# Patient Record
Sex: MALE | Race: WHITE | Employment: OTHER | URBAN - METROPOLITAN AREA
[De-identification: names, ages, dates, MRNs, and addresses within clinical notes are randomized per-mention and may not be internally consistent; named-entity substitution may affect disease eponyms.]

---

## 2018-01-31 ENCOUNTER — HOSPITAL ENCOUNTER (EMERGENCY)
Age: 78
Discharge: HOME | End: 2018-02-01
Attending: EMERGENCY MEDICINE | Admitting: EMERGENCY MEDICINE
Payer: COMMERCIAL

## 2018-01-31 DIAGNOSIS — R33.8 ACUTE URINARY RETENTION: Primary | ICD-10-CM

## 2018-01-31 PROCEDURE — 99282 EMERGENCY DEPT VISIT SF MDM: CPT

## 2018-01-31 PROCEDURE — 51702 INSERT TEMP BLADDER CATH: CPT

## 2018-01-31 PROCEDURE — 87086 URINE CULTURE/COLONY COUNT: CPT | Performed by: EMERGENCY MEDICINE

## 2018-01-31 RX ORDER — LEVOTHYROXINE SODIUM 50 UG/1
TABLET ORAL
COMMUNITY

## 2018-01-31 RX ORDER — VERAPAMIL HYDROCHLORIDE 180 MG/1
180 CAPSULE, EXTENDED RELEASE ORAL DAILY
COMMUNITY
End: 2021-02-02

## 2018-01-31 RX ORDER — MELATONIN
DAILY
COMMUNITY
End: 2021-02-02

## 2018-01-31 RX ORDER — TERAZOSIN 5 MG/1
5 CAPSULE ORAL
COMMUNITY
End: 2019-12-02 | Stop reason: SDUPTHER

## 2018-01-31 RX ORDER — DESVENLAFAXINE SUCCINATE 50 MG/1
50 TABLET, EXTENDED RELEASE ORAL
COMMUNITY
End: 2018-05-24

## 2018-01-31 RX ORDER — SIMVASTATIN 20 MG/1
TABLET, FILM COATED ORAL
COMMUNITY

## 2018-01-31 RX ORDER — LIDOCAINE HYDROCHLORIDE 20 MG/ML
JELLY TOPICAL ONCE
Status: COMPLETED | OUTPATIENT
Start: 2018-01-31 | End: 2018-02-01

## 2018-01-31 RX ORDER — ERGOCALCIFEROL 1.25 MG/1
50000 CAPSULE ORAL
COMMUNITY
End: 2018-02-20

## 2018-02-01 VITALS
HEART RATE: 62 BPM | DIASTOLIC BLOOD PRESSURE: 82 MMHG | OXYGEN SATURATION: 98 % | RESPIRATION RATE: 16 BRPM | SYSTOLIC BLOOD PRESSURE: 138 MMHG | TEMPERATURE: 97.7 F | WEIGHT: 157 LBS

## 2018-02-01 PROCEDURE — 74011000250 HC RX REV CODE- 250: Performed by: EMERGENCY MEDICINE

## 2018-02-01 PROCEDURE — 77030034849

## 2018-02-01 RX ADMIN — LIDOCAINE HYDROCHLORIDE: 20 JELLY TOPICAL at 00:09

## 2018-02-01 NOTE — ED NOTES
I have reviewed discharge instructions with the patient. The patient verbalized understanding. Patient discharged with garcia in place with leg bag.  Instructed to return in 3 days for garcia removal.

## 2018-02-01 NOTE — ED NOTES
Patient s/p procedure this AM with Propofol administered. Unable to void since 0630 this AM. Bladder distended and painful.

## 2018-02-01 NOTE — ED PROVIDER NOTES
HPI Comments: Anurag Sampson is a 66 y.o. Male who had egd done today with iv sedation only with c/o not being able to urinate since 630 am. C/o sense of urgency, lower abd pressure distention. No prior h/o retention, garcia placement. Pain, pressure is constant and not relieved with attempted urination. The history is provided by the patient. Past Medical History:   Diagnosis Date    BPH (benign prostatic hyperplasia)     Endocrine disease     Hyperlipidemia     Hypertension     Subdural hemorrhage ([facility] [address]        History reviewed. No pertinent surgical history. History reviewed. No pertinent family history. Social History     Social History    Marital status:      Spouse name: N/A    Number of children: N/A    Years of education: N/A     Occupational History    Not on file. Social History Main Topics    Smoking status: Never Smoker    Smokeless tobacco: Never Used    Alcohol use Yes      Comment: occasional    Drug use: Not on file    Sexual activity: Not on file     Other Topics Concern    Not on file     Social History Narrative    No narrative on file         ALLERGIES: Codeine    Review of Systems   Constitutional: Negative for fever. HENT: Negative for trouble swallowing. Respiratory: Negative for cough. Cardiovascular: Negative for chest pain. Gastrointestinal: Positive for abdominal distention and abdominal pain. Negative for blood in stool. Genitourinary: Positive for difficulty urinating. Negative for flank pain, penile swelling and scrotal swelling. Musculoskeletal: Positive for back pain. Negative for gait problem. Skin: Negative for rash. Allergic/Immunologic: Negative for immunocompromised state. Neurological: Negative for syncope. Psychiatric/Behavioral: Positive for sleep disturbance.        Vitals:    01/31/18 2256   BP: 135/89   Pulse: 64   Resp: 16   Temp: 97.7 °F (36.5 °C)   SpO2: 97%   Weight: 71.2 kg (157 lb) Physical Exam   Constitutional: He is oriented to person, place, and time. Non-toxic appearance. He does not appear ill. He appears distressed (appears very uncomfortabel). HENT:   Head: Normocephalic and atraumatic. Right Ear: External ear normal.   Left Ear: External ear normal.   Nose: Nose normal.   Mouth/Throat: Oropharynx is clear and moist. No oropharyngeal exudate. Eyes: Conjunctivae are normal.   Neck: Normal range of motion. Cardiovascular: Normal rate, regular rhythm, normal heart sounds and intact distal pulses. Pulmonary/Chest: Effort normal and breath sounds normal. No respiratory distress. Abdominal: Soft. He exhibits distension. He exhibits no mass. There is tenderness. There is no rebound and no guarding. Genitourinary: Penis normal.   Musculoskeletal: Normal range of motion. He exhibits no edema. Neurological: He is alert and oriented to person, place, and time. Skin: Skin is warm and dry. He is not diaphoretic. Psychiatric: His behavior is normal.   Nursing note and vitals reviewed. [facility]      ED Course       Procedures    Vitals:  Patient Vitals for the past 12 hrs:   Temp Pulse Resp BP SpO2   01/31/18 2256 97.7 °F (36.5 °C) 64 16 135/89 97 %         Medications ordered:   Medications   lidocaine (URO-JET) 2 % jelly ( Urethral Given 2/1/18 0009)         Lab findings:  No results found for this or any previous visit (from the past 12 hour(s)). EKG interpretation by ED Physician:      X-Ray, CT or other radiology findings or impressions:  No orders to display       Progress notes, Consult notes or additional Procedure notes:   Doubt need for ua. Will send culture. Drainage almost liter. Offered pt to remove and give another trial, but knowing he may have to return for repeat placement.  He will keep in and return in 3 days for removal  Also d/w him need for urology eval as well  I have discussed with patient and/or family/sig other the results, interpretation of any imaging if performed, suspected diagnosis and treatment plan to include instructions regarding the diagnoses listed to which understanding was expressed with all questions answered      Reevaluation of patient:   Stable for dc    Disposition:  Diagnosis:   1. Acute urinary retention        Disposition: home    Follow-up Information     Follow up With Details Comments Contact Info    Jamal Loo MD Schedule an appointment as soon as possible for a visit  [address] [address]  [phone]      [facility] EMERGENCY DEPT  3 days for removal garcia [address]  [phone]            Patient's Medications   Start Taking    No medications on file   Continue Taking    CHOLECALCIFEROL (VITAMIN D3) 1,000 UNIT TABLET    Take  by mouth daily. DESVENLAFAXINE SUCCINATE (PRISTIQ) 50 MG ER TABLET    Take 50 mg by mouth. ERGOCALCIFEROL (VITAMIN D2) 50,000 UNIT CAPSULE    Take 50,000 Units by mouth. LEVOTHYROXINE (SYNTHROID) 50 MCG TABLET    Take  by mouth Daily (before breakfast). SIMVASTATIN (ZOCOR) 20 MG TABLET    Take  by mouth nightly. TERAZOSIN (HYTRIN) 5 MG CAPSULE    Take 5 mg by mouth nightly. VERAPAMIL ER (VERELAN) 180 MG CR CAPSULE    Take 360 mg by mouth daily.    These Medications have changed    No medications on file   Stop Taking    No medications on file

## 2018-02-03 LAB
BACTERIA SPEC CULT: NORMAL
SERVICE CMNT-IMP: NORMAL

## 2018-02-05 ENCOUNTER — HOSPITAL ENCOUNTER (EMERGENCY)
Age: 78
Discharge: HOME | End: 2018-02-05
Attending: EMERGENCY MEDICINE
Payer: COMMERCIAL

## 2018-02-05 VITALS
HEART RATE: 58 BPM | OXYGEN SATURATION: 98 % | WEIGHT: 155 LBS | TEMPERATURE: 97.9 F | BODY MASS INDEX: 24.91 KG/M2 | HEIGHT: 66 IN | SYSTOLIC BLOOD PRESSURE: 119 MMHG | RESPIRATION RATE: 16 BRPM | DIASTOLIC BLOOD PRESSURE: 80 MMHG

## 2018-02-05 DIAGNOSIS — Z46.6 ENCOUNTER FOR FOLEY CATHETER REMOVAL: Primary | ICD-10-CM

## 2018-02-05 PROCEDURE — 99281 EMR DPT VST MAYX REQ PHY/QHP: CPT

## 2018-02-05 NOTE — DISCHARGE INSTRUCTIONS
[employer] Activation    Thank you for requesting access to [employer]. Please follow the instructions below to securely access and download your online medical record. [employer] allows you to send messages to your doctor, view your test results, renew your prescriptions, schedule appointments, and more. How Do I Sign Up? 1. In your internet browser, go to [email or web address]  2. Click on the First Time User? Click Here link in the Sign In box. You will be redirect to the New Member Sign Up page. 3. Enter your [employer] Access Code exactly as it appears below. You will not need to use this code after youve completed the sign-up process. If you do not sign up before the expiration date, you must request a new code. [employer] Access Code: [email or web address]  Expires: 2018  1:20 AM (This is the date your [employer] access code will )    4. Enter the last four digits of your Social Security Number (xxxx) and Date of Birth (mm/dd/yyyy) as indicated and click Submit. You will be taken to the next sign-up page. 5. Create a [employer] ID. This will be your [employer] login ID and cannot be changed, so think of one that is secure and easy to remember. 6. Create a [employer] password. You can change your password at any time. 7. Enter your Password Reset Question and Answer. This can be used at a later time if you forget your password. 8. Enter your e-mail address. You will receive e-mail notification when new information is available in [address] 9. Click Sign Up. You can now view and download portions of your medical record. 10. Click the Download Summary menu link to download a portable copy of your medical information. Additional Information    If you have questions, please visit the Frequently Asked Questions section of the [employer] website at [email or web address]. [employer]. [email or web address] Remember, [employer] is NOT to be used for urgent needs. For medical emergencies, dial 911. Keep well hydrated.  Drink at least 2 to 3 liters of fresh water daily. Return at once for return of urinary symptoms.

## 2018-02-05 NOTE — ED NOTES
12:50 PM  02/05/18     Discharge instructions given to patient (name) with verbalization of understanding. Patient accompanied by self. Patient discharged with the following prescriptions none. Patient discharged to home (destination).       Baltazar Fair RN

## 2018-02-05 NOTE — ED PROVIDER NOTES
EMERGENCY DEPARTMENT HISTORY AND PHYSICAL EXAM    Date: 2/5/2018  Patient Name: Terry Kendrick    History of Presenting Illness     Chief Complaint   Patient presents with    Urinary Catheter Problem         History Provided By: Patient    Chief Complaint:  Garcia Catheter removal  Duration: 6  Days  Timing:  N/A  Location: Garcia catheter in urethra  Quality: Pressure  Severity: Mild  Modifying Factors:  Previous urinary retention    Associated Symptoms: denies any other associated signs or symptoms      Additional History (Context): Terry Kendrick is a 66 y.o. male with  who presents with a request for garcia catheter removal.  He had a garcia placed on 01-31-18 for urinary retention post endoscopy. States he was told to come back today. PCP: Joey Lemon MD    Current Outpatient Prescriptions   Medication Sig Dispense Refill    terazosin (HYTRIN) 5 mg capsule Take 5 mg by mouth nightly.  verapamil ER (VERELAN) 180 mg CR capsule Take 360 mg by mouth daily.  levothyroxine (SYNTHROID) 50 mcg tablet Take  by mouth Daily (before breakfast).  desvenlafaxine succinate (PRISTIQ) 50 mg ER tablet Take 50 mg by mouth.  simvastatin (ZOCOR) 20 mg tablet Take  by mouth nightly.  cholecalciferol (VITAMIN D3) 1,000 unit tablet Take  by mouth daily.  ergocalciferol (VITAMIN D2) 50,000 unit capsule Take 50,000 Units by mouth. Past History     Past Medical History:  Past Medical History:   Diagnosis Date    BPH (benign prostatic hyperplasia)     Endocrine disease     Hyperlipidemia     Hypertension     Subdural hemorrhage ([facility] [address]        Past Surgical History:  No past surgical history on file. Family History:  No family history on file. Social History:  Social History   Substance Use Topics    Smoking status: Never Smoker    Smokeless tobacco: Never Used    Alcohol use Yes      Comment: occasional       Allergies:   Allergies   Allergen Reactions    Codeine Nausea and Vomiting         Review of Systems   Review of Systems   Constitutional: Negative. HENT: Negative. Eyes: Negative. Respiratory: Negative. Cardiovascular: Negative. Gastrointestinal: Negative. Endocrine: Negative. Genitourinary: Negative. Pt presents for urinary catheter removal.     Musculoskeletal: Negative. Skin: Negative. Allergic/Immunologic: Negative. Neurological: Negative. Hematological: Negative. Psychiatric/Behavioral: Negative. All Other Systems Negative  Physical Exam     Vitals:    02/05/18 1116   BP: 119/80   Pulse: (!) 58   Resp: 16   Temp: 97.9 °F (36.6 °C)   SpO2: 98%   Weight: 70.3 kg (155 lb)   Height: 5' 6\" (1.676 m)     Physical Exam   Constitutional: He is oriented to person, place, and time. He appears well-developed and well-nourished. No distress. HENT:   Head: Normocephalic and atraumatic. Eyes: Pupils are equal, round, and reactive to light. Neck: Normal range of motion. Neck supple. Cardiovascular: Normal rate and regular rhythm. Pulmonary/Chest: Effort normal and breath sounds normal. He has no wheezes. He has no rales. Abdominal: He exhibits no distension. Genitourinary:   Genitourinary Comments:  indwelling garcia catheter   Musculoskeletal: Normal range of motion. Neurological: He is alert and oriented to person, place, and time. Skin: Skin is warm and dry. Psychiatric: He has a normal mood and affect. Nursing note and vitals reviewed. Diagnostic Study Results     Labs -   No results found for this or any previous visit (from the past 12 hour(s)). Radiologic Studies -   No orders to display     CT Results  (Last 48 hours)    None        CXR Results  (Last 48 hours)    None            Medical Decision Making   I am the first provider for this patient. I reviewed the vital signs, available nursing notes, past medical history, past surgical history, family history and social history.     Vital Signs-Reviewed the patient's vital signs. Pulse Oximetry Analysis - 100 % on  RA     Rate:    Rhythm:   Interpretation:   Comparison:    Records Reviewed: Nursing Notes    Procedures:  Procedures    Provider Notes (Medical Decision Making):     MED RECONCILIATION:  No current facility-administered medications for this encounter. Current Outpatient Prescriptions   Medication Sig    terazosin (HYTRIN) 5 mg capsule Take 5 mg by mouth nightly.  verapamil ER (VERELAN) 180 mg CR capsule Take 360 mg by mouth daily.  levothyroxine (SYNTHROID) 50 mcg tablet Take  by mouth Daily (before breakfast).  desvenlafaxine succinate (PRISTIQ) 50 mg ER tablet Take 50 mg by mouth.  simvastatin (ZOCOR) 20 mg tablet Take  by mouth nightly.  cholecalciferol (VITAMIN D3) 1,000 unit tablet Take  by mouth daily.  ergocalciferol (VITAMIN D2) 50,000 unit capsule Take 50,000 Units by mouth. Disposition:  Discharged to Home. DISCHARGE NOTE:     Pt has been reexamined. Patient has no new complaints, changes, or physical findings. Care plan outlined and precautions discussed. All of pt's questions and concerns were addressed. Patient was instructed and agrees to follow up with his primary care physician , as well as to return to the ED upon further deterioration. Patient is ready to go home. Follow-up Information     None          Current Discharge Medication List              Diagnosis     Clinical Impression: No diagnosis found. Diagnosis:   1. Encounter for Mcmullen catheter removal          Disposition:   Discharged to Home    Follow-up Information     Follow up With Details Comments Gena Peña MD Call as needed for follow up. [address]            Patient's Medications   Start Taking    No medications on file   Continue Taking    CHOLECALCIFEROL (VITAMIN D3) 1,000 UNIT TABLET    Take  by mouth daily.     DESVENLAFAXINE SUCCINATE (PRISTIQ) 50 MG ER TABLET    Take 50 mg by mouth. ERGOCALCIFEROL (VITAMIN D2) 50,000 UNIT CAPSULE    Take 50,000 Units by mouth. LEVOTHYROXINE (SYNTHROID) 50 MCG TABLET    Take  by mouth Daily (before breakfast). SIMVASTATIN (ZOCOR) 20 MG TABLET    Take  by mouth nightly. TERAZOSIN (HYTRIN) 5 MG CAPSULE    Take 5 mg by mouth nightly. VERAPAMIL ER (VERELAN) 180 MG CR CAPSULE    Take 360 mg by mouth daily.    These Medications have changed    No medications on file   Stop Taking    No medications on file

## 2020-02-19 ENCOUNTER — HOSPITAL ENCOUNTER (OUTPATIENT)
Dept: PHYSICAL THERAPY | Age: 80
Discharge: HOME | End: 2020-02-19
Payer: COMMERCIAL

## 2020-02-19 PROCEDURE — 97162 PT EVAL MOD COMPLEX 30 MIN: CPT | Performed by: GENERAL ACUTE CARE HOSPITAL

## 2020-02-19 PROCEDURE — 97110 THERAPEUTIC EXERCISES: CPT | Performed by: GENERAL ACUTE CARE HOSPITAL

## 2020-02-19 NOTE — PROGRESS NOTES
PT DAILY TREATMENT NOTE     Patient Name: Juliane Castaneda  Date:2020  : 1940  [x]  Patient  Verified  Payor: Tracy Patel / Plan: [address] PT / Product Type: Commerical /    In time: 3:10  Out time: 3:45  Total Treatment Time (min): 35  Total Timed Codes (min): 10  1:1 Treatment Time (min): 10   Visit #: 1 of     Treatment Area: Other acute postprocedural pain [G89.18]  Left knee pain [M25.562]    SUBJECTIVE  Pain Level (0-10 scale): 0  Any medication changes, allergies to medications, adverse drug reactions, diagnosis change, or new procedure performed?: [x] No    [] Yes (see summary sheet for update)  Subjective functional status/changes:   [] No changes reported   Pt is an [de-identified] y/o male who presents s/p L TKA revision (original TKA in ). States that 4 days after surgery he was re-admitted to the hospital for 1 week due to PNA and fungal infection of esophagus. Received HHPT until last week. No longer using an assistive device for mobility. Feels that strength and endurance are his greatest barriers at this time. Only reports minor pain (1/10) with gait and daily activities. Denies use of ice for pain/edema management.  Pt will benefit from skilled PT in order to address current impairments and improve functional mobility and QOL.      Walking tolerance: 10 minutes at a time  Standing tolerance: 2 hours max      (L) knee AROM:  7-95  Patellar mobility: stiff, guarded  +ttp medial/lateral joint line, lateral gastroc     Strength:   (R) Hip flexion 4/5                              ABD 3+/5              Ext 3+/5  (R) Knee flexion 4/5            Ext 4+/5     Swelling: Circumference @ jt line  L 44.5 cm, R 38 cm, supra patella L 45.5                                 1+ Pitting edema in distal calf      Gait: reduced TKE in stance phase and knee flexion in swing, mild antalgia  SLS: 2 sec L   Stairs: reciprocal pattern with single rail     OBJECTIVE  Modality rationale: PD   Min Type Additional Details    [] Estim: []Att   []Unatt  []TENS instruct                 []IFC  []Premod []NMES                       []Other:  []w/US   []w/ice   []w/heat  Position:  Location:    []  Traction: [] Cervical       []Lumbar                       [] Prone          []Supine                       []Intermittent   []Continuous Lbs:  [] before manual  [] after manual    []  Ultrasound: []Continuous   [] Pulsed                           []1MHz   []3MHz Location:  W/cm2:    []  Iontophoresis with dexamethasone         Location: [] Take home patch   [] In clinic    []  Ice     []  heat  []  Ice massage Position:  Location:    []  Vasopneumatic Device Pressure: [] lo [] med [] hi   Temp: [] lo [] med [] hi   [] Skin assessment post-treatment:  []intact []redness- no adverse reaction       []redness - adverse reaction:       10 min Therapeutic Exercise:  [] See flow sheet :   Rationale: increase ROM and increase strength to improve the patients ability to perform functional ADL's, walk, navigate stairs             min Patient Education: [x] Review HEP    [] Progressed/Changed HEP based on:   [] positioning   [] body mechanics   [] transfers   [] heat/ice application        Other Objective/Functional Measures:   Justification for Eval Code Complexity:  Patient History : Depression, arthritis, thyroid issues, HTN, recent 10 lb weight loss (due to hospital admission)  Examination see exam   Clinical Presentation: evolving  Clinical Decision Making : FOTO : 51 /100  Pain Level (0-10 scale) post treatment: 0/10    ASSESSMENT/Changes in Function: See POC    Patient will continue to benefit from skilled PT services to modify and progress therapeutic interventions, address functional mobility deficits, address ROM deficits, address strength deficits, analyze and address soft tissue restrictions, analyze and cue movement patterns, analyze and modify body mechanics/ergonomics, assess and modify postural abnormalities, address imbalance/dizziness and instruct in home and community integration to attain remaining goals. [x]  See Plan of Care  []  See progress note/recertification  []  See Discharge Summary         Progress towards goals / Updated goals: · Short Term Goals: To be accomplished in  1  weeks:  1) Pt will be independent and compliant with HEP  · Long Term Goals:  To be accomplished in  4-6  weeks:  1) Pt will score > 60/100 on FOTO to show significant improvement in functional mobility and QOL  2) Pt will demo (L) knee flexion >110 for ease of dressing ADL's  3) Pt will demo full TKE in order to normalize gait kinematics for community ambulation  4) Pt will demo >4/5 hip strength for ease of stair navigation     PLAN  [x]  Upgrade activities as tolerated     []  Continue plan of care  []  Update interventions per flow sheet       []  Discharge due to:_  []  Other:_      Rea Joy, PT 2/19/2020  3:51 PM

## 2020-02-19 NOTE — PROGRESS NOTES
[address] [facility] [address] [facility] AT [address], [facility]  Phone: [phone] Fax: [phone] / STATEMENT OF MEDICAL NECESSITY FOR PHYSICAL THERAPY SERVICES  Patient Name: Zhanna Alvarez : 1940   Medical   Diagnosis: Other acute postprocedural pain [G89.18]  Left knee pain [M25.562] Treatment Diagnosis: Other acute postprocedural pain [G89.18]  Left knee pain [M25.562]   Onset Date: DOS 20     Referral Source: Myranda Peralta MD [facility]): 2020   Prior Hospitalization: See medical history Provider #: [id number]   Prior Level of Function: Functionally indep; retired. Lives with wife and son. [address] with bedroom on second level. Comorbidities: Depression, arthritis, thyroid issues, HTN, recent 10 lb weight loss (due to hospital admission)   Medications: Verified on Patient Summary List   The Plan of Care and following information is based on the information from the initial evaluation.   ========================================================================  Assessment / key information:  Pt is an [de-identified] y/o male who presents s/p L TKA revision (original TKA in ). States that 4 days after surgery he was re-admitted to the hospital for 1 week due to PNA and fungal infection of esophagus. Received HHPT until last week. No longer using an assistive device for mobility. Feels that strength and endurance are his greatest barriers at this time. Only reports minor pain (1/10) with gait and daily activities. Denies use of ice for pain/edema management. Pt will benefit from skilled PT in order to address current impairments and improve functional mobility and QOL.      Walking tolerance: 10 minutes at a time  Standing tolerance: 2 hours max     (L) knee AROM:  7-95  Patellar mobility: stiff, guarded  +ttp medial/lateral joint line, lateral gastroc    Strength:   (R) Hip flexion 4/5  ABD 3+/5  Ext 3+/5  (R) Knee flexion 4/5 Ext 4+/5    Swelling: Circumference @ jt line  L 44.5 cm, R 38 cm, supra patella L 45.5     1+ Pitting edema in distal calf     Gait: reduced TKE in stance phase and knee flexion in swing, mild antalgia  SLS: 2 sec L   Stairs: reciprocal pattern with single rail   ========================================================================  Eval Complexity: History: HIGH Complexity :3+ comorbidities / personal factors will impact the outcome/ POC Exam:HIGH Complexity : 4+ Standardized tests and measures addressing body structure, function, activity limitation and / or participation in recreation  Presentation: MEDIUM Complexity : Evolving with changing characteristics  Clinical Decision Making:MEDIUM Complexity : FOTO score of 26-74Overall Complexity:MEDIUM  Problem List: pain affecting function, decrease ROM, decrease strength, edema affecting function, impaired gait/ balance, decrease ADL/ functional abilitiies, decrease activity tolerance, decrease flexibility/ joint mobility and decrease transfer abilities   Treatment Plan may include any combination of the following: Therapeutic exercise, Therapeutic activities, Neuromuscular re-education, Physical agent/modality, Gait/balance training, Manual therapy, Patient education, Self Care training, Functional mobility training, Home safety training and Stair training  Patient / Family readiness to learn indicated by: asking questions, trying to perform skills and interest  Persons(s) to be included in education: patient (P)  Barriers to Learning/Limitations: None  Measures taken:    Patient Goal (s): \"painless activity\"   Patient self reported health status: good  Rehabilitation Potential: good   Short Term Goals: To be accomplished in  1  weeks:  1) Pt will be independent and compliant with HEP   Long Term Goals:  To be accomplished in  4-6  weeks:  1) Pt will score > 60/100 on FOTO to show significant improvement in functional mobility and QOL  2) Pt will demo (L) knee flexion >110 for ease of dressing ADL's  3) Pt will demo full TKE in order to normalize gait kinematics for community ambulation  4) Pt will demo >4/5 hip strength for ease of stair navigation     Frequency / Duration:   Patient to be seen  2-3  times per week for 4-6  weeks:  Patient / Caregiver education and instruction: activity modification and exercises  Therapist Signature: Denton Mcdowell PT Date: 7/91/8813   Certification Period: 2/19/20 to 5/19/20  Time: 10:03 AM   ========================================================================  I certify that the above Physical Therapy Services are being furnished while the patient is under my care. I agree with the treatment plan and certify that this therapy is necessary. Physician Signature:        Date:       Time:   Please sign and return to [facility] or you may fax the signed copy to [phone]. Thank you.

## 2020-02-26 ENCOUNTER — HOSPITAL ENCOUNTER (OUTPATIENT)
Dept: PHYSICAL THERAPY | Age: 80
Discharge: HOME | End: 2020-02-26
Payer: COMMERCIAL

## 2020-02-26 PROCEDURE — 97016 VASOPNEUMATIC DEVICE THERAPY: CPT | Performed by: GENERAL ACUTE CARE HOSPITAL

## 2020-02-26 PROCEDURE — 97110 THERAPEUTIC EXERCISES: CPT | Performed by: GENERAL ACUTE CARE HOSPITAL

## 2020-02-26 PROCEDURE — 97140 MANUAL THERAPY 1/> REGIONS: CPT | Performed by: GENERAL ACUTE CARE HOSPITAL

## 2020-02-26 NOTE — PROGRESS NOTES
PT DAILY TREATMENT NOTE     Patient Name: Quynh Watts  Date:2020  : 1940  [x]  Patient  Verified  Payor: Tiffanie Medico / Plan: [address] [place] [address] Rd PT / Product Type: Commerical /    In time: 2:00  Out time: 3:01   Total Treatment Time (min): 61  Total Timed Codes (min): 51  1:1 Treatment Time (min): 51   Visit #: 2 of     Treatment Area: Other acute postprocedural pain [G89.18]  Left knee pain [M25.562]    SUBJECTIVE  Pain Level (0-10 scale): 0/10  Any medication changes, allergies to medications, adverse drug reactions, diagnosis change, or new procedure performed?: [x] No    [] Yes (see summary sheet for update)  Subjective functional status/changes:   [] No changes reported  Saw Orthopedic MD yesterday whom reports that everything looks great and to continue with PT. Hedii haley tramadol prior to therapy today.      OBJECTIVE  Modality rationale: decrease edema, decrease inflammation and decrease pain to improve the patients ability to perform functional LE tasks    Min Type Additional Details    [] Estim: []Att   []Unatt  []TENS instruct                 []IFC  []Premod []NMES                       []Other:  []w/US   []w/ice   []w/heat  Position:  Location:    []  Traction: [] Cervical       []Lumbar                       [] Prone          []Supine                       []Intermittent   []Continuous Lbs:  [] before manual  [] after manual    []  Ultrasound: []Continuous   [] Pulsed                           []1MHz   []3MHz Location:  W/cm2:    []  Iontophoresis with dexamethasone         Location: [] Take home patch   [] In clinic    []  Ice     []  heat  []  Ice massage Position:  Location:   10 [x]  Vasopneumatic Device Pressure: [] lo [x] med [] hi   Temp: [x] lo [] med [] hi   [x] Skin assessment post-treatment:  [x]intact []redness- no adverse reaction       []redness - adverse reaction:       41 min Therapeutic Exercise:  [] See flow sheet : initiated per FS   Rationale: increase ROM and increase strength to improve the patients ability to ambulate, navigate stairs and perform       10 min Manual Therapy:  PROM L knee flexion/extension. Ant/post tib mobs for increased mobility. Stick-rolling to calf. Rationale: decrease pain, increase ROM and increase tissue extensibility to promote increased ROM for ease of dressing ADL's and gait mechanics             min Patient Education: [x] Review HEP    [] Progressed/Changed HEP based on:   [] positioning   [] body mechanics   [] transfers   [] heat/ice application        Other Objective/Functional Measures:   First FU treatment   Discomfort with prone SLR-- re assess NV and d/c if painful. (L) Knee AROM: 7-100    Pain Level (0-10 scale) post treatment: 0/10    ASSESSMENT/Changes in Function: Good tolerance to initial treatment session with patient requiring 100% VC's for all newly introduced exercises. Patient will continue to benefit from skilled PT services to modify and progress therapeutic interventions, address functional mobility deficits, address ROM deficits, address strength deficits, analyze and address soft tissue restrictions, analyze and cue movement patterns, analyze and modify body mechanics/ergonomics, assess and modify postural abnormalities, address imbalance/dizziness and instruct in home and community integration to attain remaining goals. [x]  See Plan of Care  []  See progress note/recertification  []  See Discharge Summary         Progress towards goals / Updated goals: · Short Term Goals: To be accomplished in  1  weeks:  1) Pt will be independent and compliant with HEP Met; pt reports compliance- will con't to monitor as HEP is progressed (2/26/20)  · Long Term Goals:  To be accomplished in  4-6  weeks:  1) Pt will score > 60/100 on FOTO to show significant improvement in functional mobility and QOL  2) Pt will demo (L) knee flexion >110 for ease of dressing ADL's  3) Pt will demo full TKE in order to normalize gait kinematics for community ambulation  4) Pt will demo >4/5 hip strength for ease of stair navigation     PLAN  [x]  Upgrade activities as tolerated     []  Continue plan of care  []  Update interventions per flow sheet       []  Discharge due to:_  []  Other:_      Staci Maldonado, PT 2/26/2020  1:55 PM

## 2020-02-28 ENCOUNTER — HOSPITAL ENCOUNTER (OUTPATIENT)
Dept: PHYSICAL THERAPY | Age: 80
Discharge: HOME | End: 2020-02-28
Payer: COMMERCIAL

## 2020-02-28 PROCEDURE — 97016 VASOPNEUMATIC DEVICE THERAPY: CPT

## 2020-02-28 PROCEDURE — 97140 MANUAL THERAPY 1/> REGIONS: CPT

## 2020-02-28 PROCEDURE — 97110 THERAPEUTIC EXERCISES: CPT

## 2020-02-28 NOTE — PROGRESS NOTES
PT DAILY TREATMENT NOTE     Patient Name: Elenita Melendez  Date:2020  : 1940  [x]  Patient  Verified  Payor: Joseph Karoilna / Plan: [address] PT / Product Type: Commerical /    In time: 2:00 pm          Out time: 3:08 pm  Total Treatment Time (min): 68  Total Timed Codes (min): 58  1:1 Treatment Time (min): 40  Visit #: 3 of     Treatment Area: Other acute postprocedural pain [G89.18]  Left knee pain [M25.562]    SUBJECTIVE  Pain Level (0-10 scale): 1  Any medication changes, allergies to medications, adverse drug reactions, diagnosis change, or new procedure performed?: [x] No    [] Yes (see summary sheet for update)  Subjective functional status/changes:   [] No changes reported  \"I am doing okay with the stairs.  \"    OBJECTIVE  Modality rationale: decrease edema, decrease inflammation and decrease pain to improve the patients ability to perform functional LE tasks    Min Type Additional Details    [] Estim: []Att   []Unatt  []TENS instruct                 []IFC  []Premod []NMES                       []Other:  []w/US   []w/ice   []w/heat  Position:  Location:    []  Traction: [] Cervical       []Lumbar                       [] Prone          []Supine                       []Intermittent   []Continuous Lbs:  [] before manual  [] after manual    []  Ultrasound: []Continuous   [] Pulsed                           []1MHz   []3MHz Location:  W/cm2:    []  Iontophoresis with dexamethasone         Location: [] Take home patch   [] In clinic    []  Ice     []  heat  []  Ice massage Position:  Location:   10 [x]  Vasopneumatic Device Pressure: [] lo [x] med [] hi   Temp: [x] lo [] med [] hi   [x] Skin assessment post-treatment:  [x]intact []redness- no adverse reaction       []redness - adverse reaction:     46 min Therapeutic Exercise:  [x] See flow sheet: added step ups (forward and lateral)   Rationale: increase ROM and increase strength to improve the patients ability to ambulate, navigate stairs and perform     12 min Manual Therapy:  (L) knee patellar mobs f/b retro massage; PROM L knee flexion/extension; ant/post tib mobs for increased mobility; DTM to proximal to calf   Rationale: decrease pain, increase ROM and increase tissue extensibility to promote increased ROM for ease of dressing ADL's and gait mechanics        X min Patient Education: [x] Review HEP       Other Objective/Functional Measures:   (-) quad lag with standing hip flexion  (L) knee AA/PROM flexion: 105 deg / 110 deg  Lacking full TKE with SAQs. Pain Level (0-10 scale) post treatment: 1    ASSESSMENT/Changes in Function:   Patient demos slowly improving knee mobility into flexion; responds well and requests aggressive tibial mobilizations to reduce perceived stiffness. Good weight shift and pushoff using the (L) knee for step ups, but pt req min cueing to eccentrically flex the knee for stair descent. Patient will continue to benefit from skilled PT services to modify and progress therapeutic interventions, address functional mobility deficits, address ROM deficits, address strength deficits, analyze and address soft tissue restrictions, analyze and cue movement patterns, analyze and modify body mechanics/ergonomics, assess and modify postural abnormalities, address imbalance/dizziness and instruct in home and community integration to attain remaining goals. [x]  See Plan of Care  []  See progress note/recertification  []  See Discharge Summary         Progress towards goals / Updated goals: · Short Term Goals: To be accomplished in  1  weeks:  1) Pt will be independent and compliant with HEP Met; pt reports compliance- will con't to monitor as HEP is progressed (2/26/20)  · Long Term Goals: To be accomplished in  4-6  weeks:  1) Pt will score > 60/100 on FOTO to show significant improvement in functional mobility and QOL.   2) Pt will demo (L) knee flexion >110 for ease of dressing ADL's. -Goal progressing; AAROM flexion 105 deg with belt, passive 110 deg (2/28/20)  3) Pt will demo full TKE in order to normalize gait kinematics for community ambulation. -Goal progressing; (-) quad lag with standing SLR (2/28/20)  4) Pt will demo >4/5 hip strength for ease of stair navigation.      PLAN  [x]  Upgrade activities as tolerated     [x]  Continue plan of care  []  Update interventions per flow sheet       []  Discharge due to:_  []  Other:_      Gilberto Culp, PTA 2/28/2020

## 2020-03-02 ENCOUNTER — HOSPITAL ENCOUNTER (OUTPATIENT)
Dept: PHYSICAL THERAPY | Age: 80
Discharge: HOME | End: 2020-03-02
Payer: COMMERCIAL

## 2020-03-02 PROCEDURE — 97140 MANUAL THERAPY 1/> REGIONS: CPT

## 2020-03-02 PROCEDURE — 97110 THERAPEUTIC EXERCISES: CPT

## 2020-03-02 NOTE — PROGRESS NOTES
PT DAILY TREATMENT NOTE     Patient Name: Mary Jo Jones  Date:3/2/2020  : 1940  [x]  Patient  Verified  Payor: Anu Bernnan / Plan: [address] [place] [address] Rd PT / Product Type: Commerical /    In time: 3:00 pm            Out time: 3:56 pm  Total Treatment Time (min): 56  Total Timed Codes (min): 46  1:1 Treatment Time (min): 3:10pm-3:40pm (30)  Visit #: 4 of     Treatment Area: Other acute postprocedural pain [G89.18]  Left knee pain [M25.562]    SUBJECTIVE  Pain Level (0-10 scale): 1 - \"stiff\"  Any medication changes, allergies to medications, adverse drug reactions, diagnosis change, or new procedure performed?: [x] No    [] Yes (see summary sheet for update)  Subjective functional status/changes:   [] No changes reported  \"I had to stand at the [facility] for 1 hour and I am pretty okay, but I took a pain pill. \"    OBJECTIVE  Modality rationale: decrease edema, decrease inflammation and decrease pain to improve the patients ability to perform functional LE tasks    Min Type Additional Details    [] Estim: []Att   []Unatt  []TENS instruct                 []IFC  []Premod []NMES                       []Other:  []w/US   []w/ice   []w/heat  Position:  Location:    []  Traction: [] Cervical       []Lumbar                       [] Prone          []Supine                       []Intermittent   []Continuous Lbs:  [] before manual  [] after manual    []  Ultrasound: []Continuous   [] Pulsed                           []1MHz   []3MHz Location:  W/cm2:    []  Iontophoresis with dexamethasone         Location: [] Take home patch   [] In clinic   10 [x]  Ice     []  heat  []  Ice massage Position: supine  Location: (L) knee    []  Vasopneumatic Device Pressure: [] lo [] med [] hi   Temp: [] lo [] med [] hi   [x] Skin assessment post-treatment:  [x]intact []redness- no adverse reaction       []redness - adverse reaction:     35 min Therapeutic Exercise:  [x] See flow sheet:    Rationale: increase ROM and increase strength to improve the patients ability to ambulate, navigate stairs and perform     11 min Manual Therapy:  (L) knee patellar mobs f/b retro massage; PROM L knee flexion/extension; ant/post tib mobs for increased mobility; DTM to proximal to calf   Rationale: decrease pain, increase ROM and increase tissue extensibility to promote increased ROM for ease of dressing ADL's and gait mechanics        X min Patient Education: [x] Review HEP       Other Objective/Functional Measures:   (+) quad lag with SLR    Pain Level (0-10 scale) post treatment: 0    ASSESSMENT/Changes in Function:   Patient challenged with step ups today req min cueing to reduce speed to improve quad recruitment. Patient will continue to benefit from skilled PT services to modify and progress therapeutic interventions, address functional mobility deficits, address ROM deficits, address strength deficits, analyze and address soft tissue restrictions, analyze and cue movement patterns, analyze and modify body mechanics/ergonomics, assess and modify postural abnormalities, address imbalance/dizziness and instruct in home and community integration to attain remaining goals. [x]  See Plan of Care  []  See progress note/recertification  []  See Discharge Summary         Progress towards goals / Updated goals: · Short Term Goals: To be accomplished in  1  weeks:  1) Pt will be independent and compliant with HEP Met; pt reports compliance- will con't to monitor as HEP is progressed (2/26/20)  · Long Term Goals: To be accomplished in  4-6  weeks:  1) Pt will score > 60/100 on FOTO to show significant improvement in functional mobility and QOL.   2) Pt will demo (L) knee flexion >110 for ease of dressing ADL's. -Goal progressing; AAROM flexion 105 deg with belt, passive 110 deg (2/28/20)  3) Pt will demo full TKE in order to normalize gait kinematics for community ambulation. -Goal progressing; (-) quad lag with standing SLR (2/28/20)  4) Pt will demo >4/5 hip strength for ease of stair navigation.      PLAN  [x]  Upgrade activities as tolerated     [x]  Continue plan of care  []  Update interventions per flow sheet       []  Discharge due to:_  []  Other:_      Mariella Graves, MARIYA 3/2/2020

## 2020-03-04 ENCOUNTER — APPOINTMENT (OUTPATIENT)
Dept: PHYSICAL THERAPY | Age: 80
End: 2020-03-04
Payer: COMMERCIAL

## 2020-03-06 ENCOUNTER — HOSPITAL ENCOUNTER (OUTPATIENT)
Dept: PHYSICAL THERAPY | Age: 80
End: 2020-03-06
Payer: COMMERCIAL

## 2020-03-09 ENCOUNTER — APPOINTMENT (OUTPATIENT)
Dept: PHYSICAL THERAPY | Age: 80
End: 2020-03-09
Payer: COMMERCIAL

## 2020-03-10 ENCOUNTER — APPOINTMENT (OUTPATIENT)
Dept: PHYSICAL THERAPY | Age: 80
End: 2020-03-10

## 2020-03-11 ENCOUNTER — APPOINTMENT (OUTPATIENT)
Dept: PHYSICAL THERAPY | Age: 80
End: 2020-03-11
Payer: COMMERCIAL

## 2020-03-13 ENCOUNTER — APPOINTMENT (OUTPATIENT)
Dept: PHYSICAL THERAPY | Age: 80
End: 2020-03-13
Payer: COMMERCIAL

## 2020-03-16 ENCOUNTER — APPOINTMENT (OUTPATIENT)
Dept: PHYSICAL THERAPY | Age: 80
End: 2020-03-16
Payer: COMMERCIAL

## 2020-03-18 ENCOUNTER — APPOINTMENT (OUTPATIENT)
Dept: PHYSICAL THERAPY | Age: 80
End: 2020-03-18
Payer: COMMERCIAL

## 2020-03-20 ENCOUNTER — APPOINTMENT (OUTPATIENT)
Dept: PHYSICAL THERAPY | Age: 80
End: 2020-03-20
Payer: COMMERCIAL

## 2020-04-01 NOTE — PROGRESS NOTES
[address] [facility] [address] [facility] AT [address]  Phone: [phone] Fax: [phone]  PROGRESS NOTE  Patient Name: Willy Duffy : 1940   Treatment/Medical Diagnosis: Other acute postprocedural pain [G89.18]  Left knee pain [M25.562]   Referral Source: Olga Macias MD     Date of Initial Visit: 2020 Attended Visits: 4 Missed Visits: 2     SUMMARY OF TREATMENT  Thank you for sending your patient to be cared for in our facility. While we are STILL OPEN for essential patients, this pt has been placed on a temporarily hold due to the nationwide concerns over COVID-19. The physical therapist has issued HEP and therapy staff will continue to contact pt every 1-2 weeks via phone to monitor progress as the patient feels necessary. If the patient desires to return, we plan to resume physical therapy once concerns improve and will be performing reassessments upon return. Thank you and please feel free to reach out to use with any questions or concerns. If you have any questions/comments please contact us directly at [phone]   Thank you for allowing us to assist in the care of your patient. Therapist Signature: Breana Betts PT Date: 2020   Reporting Period  NA Time: 6:52 AM   NOTE TO PHYSICIAN:  PLEASE COMPLETE THE ORDERS BELOW AND FAX TO   [facility]: [phone]. If you are unable to process this request in 24 hours please contact our office: [phone].  ___ I have read the above report and request that my patient continue as recommended.   ___ I have read the above report and request that my patient continue therapy with the following changes/special instructions:_________________________________________________________   ___ I have read the above report and request that my patient be discharged from therapy.      Physician Signature:        Date:       Time:

## 2020-07-13 NOTE — PROGRESS NOTES
[address] [facility] [address] [facility] AT [address] [facility] [place] [address]  Phone: [phone] Fax: [phone]  DISCHARGE NOTE   Patient Name: Es Parks : 1940   Treatment/Medical Diagnosis: Other acute postprocedural pain [G89.18]  Left knee pain [M25.562]   Referral Source: Komal Lucas MD     Date of Initial Visit: 2020 Attended Visits: 4 Missed Visits: 2     SUMMARY OF TREATMENT  Patient last seen on 3/2/2020 and declined return to PT during COVID-19 pandemic. Patient was given HEP upon last attended session. Patient will be DC to HEP at this time. If you have any questions/comments please contact us directly at [phone]   Thank you for allowing us to assist in the care of your patient.   Therapist Signature: Naveed Mullins PT Date: 2020   Reporting Period  2020-3/2/2020  Time: 567N